# Patient Record
Sex: FEMALE | Race: WHITE | HISPANIC OR LATINO | URBAN - METROPOLITAN AREA
[De-identification: names, ages, dates, MRNs, and addresses within clinical notes are randomized per-mention and may not be internally consistent; named-entity substitution may affect disease eponyms.]

---

## 2020-05-26 ENCOUNTER — APPOINTMENT (RX ONLY)
Dept: URBAN - METROPOLITAN AREA CLINIC 45 | Facility: CLINIC | Age: 45
Setting detail: DERMATOLOGY
End: 2020-05-26

## 2020-05-26 DIAGNOSIS — D18.0 HEMANGIOMA: ICD-10-CM

## 2020-05-26 DIAGNOSIS — D22 MELANOCYTIC NEVI: ICD-10-CM

## 2020-05-26 DIAGNOSIS — L81.4 OTHER MELANIN HYPERPIGMENTATION: ICD-10-CM

## 2020-05-26 DIAGNOSIS — L68.0 HIRSUTISM: ICD-10-CM

## 2020-05-26 DIAGNOSIS — L82.1 OTHER SEBORRHEIC KERATOSIS: ICD-10-CM

## 2020-05-26 DIAGNOSIS — L98.8 OTHER SPECIFIED DISORDERS OF THE SKIN AND SUBCUTANEOUS TISSUE: ICD-10-CM

## 2020-05-26 PROBLEM — D48.5 NEOPLASM OF UNCERTAIN BEHAVIOR OF SKIN: Status: ACTIVE | Noted: 2020-05-26

## 2020-05-26 PROBLEM — D18.01 HEMANGIOMA OF SKIN AND SUBCUTANEOUS TISSUE: Status: ACTIVE | Noted: 2020-05-26

## 2020-05-26 PROBLEM — D22.5 MELANOCYTIC NEVI OF TRUNK: Status: ACTIVE | Noted: 2020-05-26

## 2020-05-26 PROCEDURE — ? COUNSELING

## 2020-05-26 PROCEDURE — ? SUNSCREEN RECOMMENDATIONS

## 2020-05-26 PROCEDURE — ? RECOMMENDATIONS

## 2020-05-26 PROCEDURE — ? OBSERVATION AND MEASURE

## 2020-05-26 PROCEDURE — ? PRESCRIPTION

## 2020-05-26 PROCEDURE — 99214 OFFICE O/P EST MOD 30 MIN: CPT | Mod: 25

## 2020-05-26 PROCEDURE — ? BIOPSY BY SHAVE METHOD

## 2020-05-26 PROCEDURE — ? TREATMENT REGIMEN

## 2020-05-26 PROCEDURE — 11102 TANGNTL BX SKIN SINGLE LES: CPT

## 2020-05-26 PROCEDURE — 11103 TANGNTL BX SKIN EA SEP/ADDL: CPT

## 2020-05-26 ASSESSMENT — LOCATION ZONE DERM
LOCATION ZONE: TRUNK
LOCATION ZONE: FACE

## 2020-05-26 ASSESSMENT — LOCATION SIMPLE DESCRIPTION DERM
LOCATION SIMPLE: ABDOMEN
LOCATION SIMPLE: RIGHT UPPER BACK
LOCATION SIMPLE: CHEST
LOCATION SIMPLE: RIGHT LOWER BACK
LOCATION SIMPLE: LEFT UPPER BACK
LOCATION SIMPLE: INFERIOR FOREHEAD
LOCATION SIMPLE: LEFT CHEEK
LOCATION SIMPLE: RIGHT CHEEK
LOCATION SIMPLE: UPPER BACK

## 2020-05-26 ASSESSMENT — LOCATION DETAILED DESCRIPTION DERM
LOCATION DETAILED: INFERIOR THORACIC SPINE
LOCATION DETAILED: RIGHT INFERIOR LATERAL LOWER BACK
LOCATION DETAILED: RIGHT RIB CAGE
LOCATION DETAILED: LEFT LATERAL MANDIBULAR CHEEK
LOCATION DETAILED: RIGHT LATERAL MANDIBULAR CHEEK
LOCATION DETAILED: LEFT MID-UPPER BACK
LOCATION DETAILED: RIGHT MEDIAL SUPERIOR CHEST
LOCATION DETAILED: RIGHT SUPERIOR UPPER BACK
LOCATION DETAILED: LEFT INFERIOR PREAURICULAR CHEEK
LOCATION DETAILED: RIGHT SUPERIOR CENTRAL MALAR CHEEK
LOCATION DETAILED: INFERIOR MID FOREHEAD
LOCATION DETAILED: RIGHT INFERIOR LATERAL MIDBACK

## 2020-05-26 NOTE — PROCEDURE: BIOPSY BY SHAVE METHOD
Detail Level: Detailed
Depth Of Biopsy: dermis
Was A Bandage Applied: Yes
Size Of Lesion In Cm: 0
Biopsy Type: H and E
Biopsy Method: double edge Personna blade
Anesthesia Type: 1% lidocaine with epinephrine and a 1:10 solution of 8.4% sodium bicarbonate
Anesthesia Volume In Cc (Will Not Render If 0): 1
Hemostasis: Aluminum Chloride
Wound Care: Aquaphor
Dressing: bandage
Destruction After The Procedure: No
Type Of Destruction Used: Curettage
Curettage Text: The wound bed was treated with curettage after the biopsy was performed.
Cryotherapy Text: The wound bed was treated with cryotherapy after the biopsy was performed.
Electrodesiccation Text: The wound bed was treated with electrodesiccation after the biopsy was performed.
Electrodesiccation And Curettage Text: The wound bed was treated with electrodesiccation and curettage after the biopsy was performed.
Silver Nitrate Text: The wound bed was treated with silver nitrate after the biopsy was performed.
Lab: 6
Lab Facility: [id number]
Path Notes (To The Dermatopathologist): Please filter
Consent: Written consent was obtained and risks were reviewed including but not limited to scarring, infection, bleeding, scabbing, incomplete removal, nerve damage and allergy to anesthesia.
Post-Care Instructions: I reviewed with the patient in detail post-care instructions. Patient is to keep the site covered for 24 hours. After this time period they may gently cleanse the site during baths/showers,  they may then  apply  petrolatum or Aquaphor and a bandage daily until healed.
Notification Instructions: Patient will be notified of biopsy results. However, patient instructed to call the office if not contacted within 2 weeks.
Billing Type: Third-Party Bill
Information: Selecting Yes will display possible errors in your note based on the variables you have selected. This validation is only offered as a suggestion for you. PLEASE NOTE THAT THE VALIDATION TEXT WILL BE REMOVED WHEN YOU FINALIZE YOUR NOTE. IF YOU WANT TO FAX A PRELIMINARY NOTE YOU WILL NEED TO TOGGLE THIS TO 'NO' IF YOU DO NOT WANT IT IN YOUR FAXED NOTE.
Size Of Lesion In Cm: 0.9
X Size Of Lesion In Cm: 0.5

## 2020-05-26 NOTE — PROCEDURE: TREATMENT REGIMEN
Detail Level: Zone
Initiate Treatment: Continue Tretinoin to increase to Tretinoin 0.1% sold in office

## 2020-05-26 NOTE — PROCEDURE: OBSERVATION
Body Location Override (Optional - Billing Will Still Be Based On Selected Body Map Location If Applicable): right mid lateral back
Detail Level: Detailed
Size Of Lesion: 0.5 x 0.2 mm
Body Location Override (Optional - Billing Will Still Be Based On Selected Body Map Location If Applicable): right lower lateral back
Size Of Lesion: 0.4 x 0.2mm

## 2020-07-30 ENCOUNTER — OFFICE VISIT (OUTPATIENT)
Dept: URBAN - METROPOLITAN AREA CLINIC 78 | Facility: CLINIC | Age: 45
End: 2020-07-30
Payer: OTHER GOVERNMENT

## 2020-07-30 DIAGNOSIS — K82.4 GALLBLADDER POLYP: ICD-10-CM

## 2020-07-30 DIAGNOSIS — F40.240 CLAUSTROPHOBIA: ICD-10-CM

## 2020-07-30 DIAGNOSIS — R10.11 RUQ PAIN: ICD-10-CM

## 2020-07-30 PROCEDURE — G9903 PT SCRN TBCO ID AS NON USER: HCPCS | Performed by: INTERNAL MEDICINE

## 2020-07-30 PROCEDURE — 99215 OFFICE O/P EST HI 40 MIN: CPT | Performed by: INTERNAL MEDICINE

## 2020-07-30 PROCEDURE — G8420 CALC BMI NORM PARAMETERS: HCPCS | Performed by: INTERNAL MEDICINE

## 2020-07-30 PROCEDURE — 1036F TOBACCO NON-USER: CPT | Performed by: INTERNAL MEDICINE

## 2020-07-30 PROCEDURE — G8427 DOCREV CUR MEDS BY ELIG CLIN: HCPCS | Performed by: INTERNAL MEDICINE

## 2020-07-30 RX ORDER — HYOSCYAMINE SULFATE 0.12 MG/1
1 TABLET UNDER THE TONGUE AND ALLOW TO DISSOLVE  AS NEEDED TABLET, ORALLY DISINTEGRATING ORAL
Qty: 40 | Refills: 1 | OUTPATIENT
Start: 2020-07-30

## 2020-07-30 NOTE — HPI-OTHER HISTORIES
The patient has been seen by Dr. Corbett in the past.  She feels a burning on the RUQ, right below, her breast, It is intermittent. She  states that when she gets the pain she is doubled over. She feels that she cannot breath or walk. More concerningly she developed a nodule in that area just a few days ago followed by a localized bruise "which developed out of the blue" once that nodule disappeared. She had never developed a bruise in this area previously. She specifically denies any trauma to the site. She has her GB in place nad has undergone extensive prior evaluation as detailed below. She has a known small GB polyp. She has suffered from constipation in the past. She used to be on Linzess. She is now taking prunes which she feels help her tremendously with having a soft, formed BM daily.  She has had E/C.  Her aunt had bile duct cancer.   Summary of prior workup: - HIDA scan in 2019 was normal. EF was 70%.  - E/C March 2019 by Dr. Corbett: small HH, antral edema (no H pylori), normal duodenum (no celiac sprue). Normal colon to the cecum.  - RUQ US: 2mm GB polyp, o/w unremarkable.  - Labs 6/16: Normal CBC, CMP, TSH and HbA1c.

## 2020-08-20 ENCOUNTER — OFFICE VISIT (OUTPATIENT)
Dept: URBAN - METROPOLITAN AREA CLINIC 98 | Facility: CLINIC | Age: 45
End: 2020-08-20

## 2020-08-31 ENCOUNTER — OFFICE VISIT (OUTPATIENT)
Dept: URBAN - METROPOLITAN AREA CLINIC 78 | Facility: CLINIC | Age: 45
End: 2020-08-31

## 2020-10-05 ENCOUNTER — OFFICE VISIT (OUTPATIENT)
Dept: URBAN - METROPOLITAN AREA CLINIC 78 | Facility: CLINIC | Age: 45
End: 2020-10-05

## 2020-10-05 RX ORDER — HYOSCYAMINE SULFATE 0.12 MG/1
1 TABLET UNDER THE TONGUE AND ALLOW TO DISSOLVE  AS NEEDED TABLET, ORALLY DISINTEGRATING ORAL
Qty: 40 | Refills: 1 | Status: ACTIVE | COMMUNITY
Start: 2020-07-30

## 2020-10-05 RX ORDER — HYOSCYAMINE SULFATE 0.12 MG/1
1 TABLET UNDER THE TONGUE AND ALLOW TO DISSOLVE  AS NEEDED TABLET, ORALLY DISINTEGRATING ORAL
Qty: 40 | Refills: 1 | OUTPATIENT

## 2020-10-05 NOTE — HPI-OTHER HISTORIES
The patient has been seen by Dr. oCrbett in the past.  She feels a burning on the RUQ, right below, her breast, It is intermittent. She  states that when she gets the pain she is doubled over. She feels that she cannot breath or walk. More concerningly she developed a nodule in that area just a few days ago followed by a localized bruise "which developed out of the blue" once that nodule disappeared. She had never developed a bruise in this area previously. She specifically denies any trauma to the site. She has her GB in place nad has undergone extensive prior evaluation as detailed below. She has a known small GB polyp. She has suffered from constipation in the past. She used to be on Linzess. She is now taking prunes which she feels help her tremendously with having a soft, formed BM daily.  She has had E/C.  Her aunt had bile duct cancer.   Summary of prior workup: - MRI on 8/13/20: Visualized portions of the lower chest normal. Unremarkable liver, GB, biliary tree, pancreas, spleen, adrenal glands, kidneys and ureters. Retroverted uterus, no myometrial mass. Several Nabothian cysts in the cervix.  - HIDA scan in 2019 was normal. EF was 70%.  - E/C March 2019 by Dr. Corbett: small HH, antral edema (no H pylori), normal duodenum (no celiac sprue). Normal colon to the cecum.  - RUQ US: 2mm GB polyp, o/w unremarkable.  - Labs 6/16: Normal CBC, CMP, TSH and HbA1c.

## 2020-12-22 ENCOUNTER — OFFICE VISIT (OUTPATIENT)
Dept: URBAN - METROPOLITAN AREA CLINIC 78 | Facility: CLINIC | Age: 45
End: 2020-12-22

## 2020-12-22 RX ORDER — HYOSCYAMINE SULFATE 0.12 MG/1
1 TABLET UNDER THE TONGUE AND ALLOW TO DISSOLVE  AS NEEDED TABLET, ORALLY DISINTEGRATING ORAL
Qty: 40 | Refills: 1 | OUTPATIENT

## 2020-12-22 NOTE — HPI-OTHER HISTORIES
The patient has been seen by Dr. Corbett in the past.  She feels a burning on the RUQ, right below, her breast, It is intermittent. She  states that when she gets the pain she is doubled over. She feels that she cannot breath or walk. More concerningly she developed a nodule in that area just a few days ago followed by a localized bruise "which developed out of the blue" once that nodule disappeared. She had never developed a bruise in this area previously. She specifically denies any trauma to the site. She has her GB in place nad has undergone extensive prior evaluation as detailed below. She has a known small GB polyp. She has suffered from constipation in the past. She used to be on Linzess. She is now taking prunes which she feels help her tremendously with having a soft, formed BM daily.  She has had E/C.  Her aunt had bile duct cancer.   Summary of prior workup: - MRI on 8/13/20: Visualized portions of the lower chest normal. Unremarkable liver, GB, biliary tree, pancreas, spleen, adrenal glands, kidneys and ureters. Retroverted uterus, no myometrial mass. Several Nabothian cysts in the cervix.  - HIDA scan in 2019 was normal. EF was 70%.  - E/C March 2019 by Dr. Corbett: small HH, antral edema (no H pylori), normal duodenum (no celiac sprue). Normal colon to the cecum.  - RUQ US: 2mm GB polyp, o/w unremarkable.  - Labs 6/16: Normal CBC, CMP, TSH and HbA1c.

## 2021-01-04 ENCOUNTER — LAB OUTSIDE AN ENCOUNTER (OUTPATIENT)
Dept: URBAN - METROPOLITAN AREA CLINIC 78 | Facility: CLINIC | Age: 46
End: 2021-01-04

## 2021-01-25 ENCOUNTER — OFFICE VISIT (OUTPATIENT)
Dept: URBAN - METROPOLITAN AREA CLINIC 78 | Facility: CLINIC | Age: 46
End: 2021-01-25
Payer: OTHER GOVERNMENT

## 2021-01-25 DIAGNOSIS — K58.9 IBS (IRRITABLE BOWEL SYNDROME): ICD-10-CM

## 2021-01-25 DIAGNOSIS — F43.9 STRESS: ICD-10-CM

## 2021-01-25 DIAGNOSIS — R10.11 RUQ PAIN: ICD-10-CM

## 2021-01-25 DIAGNOSIS — K82.4 GALLBLADDER POLYP: ICD-10-CM

## 2021-01-25 DIAGNOSIS — R14.0 BLOATING: ICD-10-CM

## 2021-01-25 DIAGNOSIS — F40.240 CLAUSTROPHOBIA: ICD-10-CM

## 2021-01-25 PROCEDURE — G8482 FLU IMMUNIZE ORDER/ADMIN: HCPCS | Performed by: INTERNAL MEDICINE

## 2021-01-25 PROCEDURE — 99214 OFFICE O/P EST MOD 30 MIN: CPT | Performed by: INTERNAL MEDICINE

## 2021-01-25 PROCEDURE — G9903 PT SCRN TBCO ID AS NON USER: HCPCS | Performed by: INTERNAL MEDICINE

## 2021-01-25 PROCEDURE — G8420 CALC BMI NORM PARAMETERS: HCPCS | Performed by: INTERNAL MEDICINE

## 2021-01-25 PROCEDURE — G8427 DOCREV CUR MEDS BY ELIG CLIN: HCPCS | Performed by: INTERNAL MEDICINE

## 2021-01-25 RX ORDER — OMEPRAZOLE 40 MG/1
1 CAPSULE 30 MINUTES BEFORE MORNING MEAL AND 30 MINUTES BEFORE DINNER CAPSULE, DELAYED RELEASE ORAL BID
Qty: 60 | Refills: 2 | OUTPATIENT

## 2021-01-25 RX ORDER — HYOSCYAMINE SULFATE 0.12 MG/1
1 TABLET UNDER THE TONGUE AND ALLOW TO DISSOLVE  AS NEEDED TABLET, ORALLY DISINTEGRATING ORAL
Qty: 40 | Refills: 1 | OUTPATIENT

## 2021-01-25 RX ORDER — HYOSCYAMINE SULFATE 0.12 MG/1
1 TABLET UNDER THE TONGUE AND ALLOW TO DISSOLVE  AS NEEDED TABLET, ORALLY DISINTEGRATING ORAL
Qty: 40 | Refills: 1 | Status: ACTIVE | COMMUNITY

## 2021-01-25 NOTE — HPI-OTHER HISTORIES
She feels a burning on the RUQ, right below, her breast. It is intermittent. She  states that when she gets the pain she is doubled over. She feels that she cannot breath or walk. More concerningly she developed a nodule in that area just a few days ago followed by a localized bruise "which developed out of the blue" once that nodule disappeared. She had never developed a bruise in this area previously. She specifically denies any trauma to the site.  of note, she had another episode of acute pain in early January and had the labs I had requested done at that time. These were unremarkable as detailed below.  She has the pain currently, but it is different from her baseline. She currently descibes it as a dull/burning sensation, constant, 7-8/10 in intensity.  She has her GB in place nad has undergone extensive prior evaluation as detailed below. She has a known small GB polyp. Today we reviewed the results of her MRI.  She has suffered from constipation in the past. She used to be on Linzess. She is now taking prunes which she feels help her tremendously with having a soft, formed BM daily.   Her aunt had bile duct cancer.   She got COVID in December.   Summary of prior workup: - Labs 1/4/20: TP 7.9, Alb 4.8, TB 0.4, AP 95, AST 17, ALT 19, Amylase 52, Lipase 22.  - MRI on 8/13/20: Visualized portions of the lower chest normal. Unremarkable liver, GB, biliary tree, pancreas, spleen, adrenal glands, kidneys and ureters. Retroverted uterus, no myometrial mass. Several Nabothian cysts in the cervix.  - HIDA scan in 2019 was normal. EF was 70%.  - E/C March 2019 by Dr. Corbett: small HH, antral edema (no H pylori), normal duodenum (no celiac sprue). Normal colon to the cecum.  - RUQ US: 2mm GB polyp, o/w unremarkable.  - Labs 6/16: Normal CBC, CMP, TSH and HbA1c.

## 2021-02-21 LAB
ALBUMIN/GLOBULIN RATIO: 1.5
ALBUMIN: 4.8
ALKALINE PHOSPHATASE: 95
ALT (SGPT): 19
AMYLASE: 52
AST (SGOT): 17
BILIRUBIN, DIRECT: 0.1
BILIRUBIN, INDIRECT: 0.3
BILIRUBIN, TOTAL: 0.4
GLOBULIN: 3.1
LIPASE: 22
PROTEIN, TOTAL: 7.9

## 2021-04-01 ENCOUNTER — OFFICE VISIT (OUTPATIENT)
Dept: URBAN - METROPOLITAN AREA CLINIC 78 | Facility: CLINIC | Age: 46
End: 2021-04-01

## 2021-04-01 ENCOUNTER — OFFICE VISIT (OUTPATIENT)
Dept: URBAN - METROPOLITAN AREA CLINIC 78 | Facility: CLINIC | Age: 46
End: 2021-04-01
Payer: OTHER GOVERNMENT

## 2021-04-01 DIAGNOSIS — F43.9 STRESS: ICD-10-CM

## 2021-04-01 DIAGNOSIS — R14.0 BLOATING: ICD-10-CM

## 2021-04-01 DIAGNOSIS — R10.11 RUQ PAIN: ICD-10-CM

## 2021-04-01 DIAGNOSIS — K82.4 GALLBLADDER POLYP: ICD-10-CM

## 2021-04-01 DIAGNOSIS — K58.9 IBS (IRRITABLE BOWEL SYNDROME): ICD-10-CM

## 2021-04-01 DIAGNOSIS — F40.240 CLAUSTROPHOBIA: ICD-10-CM

## 2021-04-01 PROCEDURE — 99213 OFFICE O/P EST LOW 20 MIN: CPT | Performed by: INTERNAL MEDICINE

## 2021-04-01 RX ORDER — HYOSCYAMINE SULFATE 0.12 MG/1
1 TABLET UNDER THE TONGUE AND ALLOW TO DISSOLVE  AS NEEDED TABLET, ORALLY DISINTEGRATING ORAL
Qty: 40 | Refills: 1 | OUTPATIENT

## 2021-04-01 RX ORDER — OMEPRAZOLE 40 MG/1
1 CAPSULE 30 MINUTES BEFORE MORNING MEAL AND 30 MINUTES BEFORE DINNER CAPSULE, DELAYED RELEASE ORAL BID
Qty: 60 | Refills: 2 | Status: ACTIVE | COMMUNITY

## 2021-04-01 RX ORDER — HYOSCYAMINE SULFATE 0.12 MG/1
1 TABLET UNDER THE TONGUE AND ALLOW TO DISSOLVE  AS NEEDED TABLET, ORALLY DISINTEGRATING ORAL
Qty: 40 | Refills: 1 | Status: ACTIVE | COMMUNITY

## 2021-04-01 NOTE — HPI-OTHER HISTORIES
She feels a burning on the RUQ, right below, her breast. It is intermittent. She  states that when she gets the pain she is doubled over. She feels that she cannot breath or walk. More concerningly she developed a nodule in that area just a few days ago followed by a localized bruise "which developed out of the blue" once that nodule disappeared. She had never developed a bruise in this area previously. She specifically denies any trauma to the site.  Of note, she had another episode of acute pain in early January and had the labs I had requested done at that time. These were unremarkable as detailed below.  She has been compliant with both the Omeprazole and IB Antonina. She feels that the bloating has improved but not the pain.  She has the pain currently, but it is different from her baseline. She currently descibes it as a dull/burning sensation, constant, 7-8/10 in intensity.  She has her GB in place nad has undergone extensive prior evaluation as detailed below. She has a known small GB polyp.  She has suffered from constipation in the past. She used to be on Linzess. She is now taking prunes which she feels help her tremendously with having a soft, formed BM daily.  She has beenusing Colace. Her stools have been  She has not yet tried using the Levsin.   Her aunt had bile duct cancer.   She had a hysterectomy 4 weeks ago. She took pain meds for about 3-4 days (Oxycodone) ad since has been taking Ibuprofen prn. She was told she may have had some endometriosis.   She got COVID in December.   Summary of prior workup: - CT A/P on 3/23/21: S/P hysterectomy without apparent complication. Small fat-containing umbilical hernia. No pelvic or inguinal hernia. No free fluid seen within the pelvic cavity.  - Labs 1/4/20: TP 7.9, Alb 4.8, TB 0.4, AP 95, AST 17, ALT 19, Amylase 52, Lipase 22.  - MRI on 8/13/20: Visualized portions of the lower chest normal. Unremarkable liver, GB, biliary tree, pancreas, spleen, adrenal glands, kidneys and ureters. Retroverted uterus, no myometrial mass. Several Nabothian cysts in the cervix.  - HIDA scan in 2019 was normal. EF was 70%.  - E/C March 2019 by Dr. Corbett: small HH, antral edema (no H pylori), normal duodenum (no celiac sprue). Normal colon to the cecum.  - RUQ US: 2mm GB polyp, o/w unremarkable.  - Labs 6/16: Normal CBC, CMP, TSH and HbA1c.

## 2021-05-10 ENCOUNTER — OFFICE VISIT (OUTPATIENT)
Dept: URBAN - METROPOLITAN AREA CLINIC 78 | Facility: CLINIC | Age: 46
End: 2021-05-10

## 2021-07-06 ENCOUNTER — OFFICE VISIT (OUTPATIENT)
Dept: URBAN - METROPOLITAN AREA CLINIC 78 | Facility: CLINIC | Age: 46
End: 2021-07-06

## 2021-07-06 RX ORDER — HYOSCYAMINE SULFATE 0.12 MG/1
1 TABLET UNDER THE TONGUE AND ALLOW TO DISSOLVE  AS NEEDED TABLET, ORALLY DISINTEGRATING ORAL
Qty: 40 | Refills: 1 | OUTPATIENT

## 2021-12-15 ENCOUNTER — OFFICE VISIT (OUTPATIENT)
Dept: URBAN - METROPOLITAN AREA CLINIC 78 | Facility: CLINIC | Age: 46
End: 2021-12-15
Payer: OTHER GOVERNMENT

## 2021-12-15 VITALS
HEART RATE: 65 BPM | TEMPERATURE: 98 F | DIASTOLIC BLOOD PRESSURE: 78 MMHG | SYSTOLIC BLOOD PRESSURE: 118 MMHG | HEIGHT: 64 IN | BODY MASS INDEX: 25.88 KG/M2 | WEIGHT: 151.6 LBS

## 2021-12-15 DIAGNOSIS — R76.8 POSITIVE HEPATITIS C ANTIBODY TEST: ICD-10-CM

## 2021-12-15 PROCEDURE — G8420 CALC BMI NORM PARAMETERS: HCPCS | Performed by: INTERNAL MEDICINE

## 2021-12-15 PROCEDURE — 99214 OFFICE O/P EST MOD 30 MIN: CPT | Performed by: INTERNAL MEDICINE

## 2021-12-15 PROCEDURE — 3017F COLORECTAL CA SCREEN DOC REV: CPT | Performed by: INTERNAL MEDICINE

## 2021-12-15 PROCEDURE — G9622 NO UNHEAL ETOH USER: HCPCS | Performed by: INTERNAL MEDICINE

## 2021-12-15 PROCEDURE — 1036F TOBACCO NON-USER: CPT | Performed by: INTERNAL MEDICINE

## 2021-12-15 PROCEDURE — G8427 DOCREV CUR MEDS BY ELIG CLIN: HCPCS | Performed by: INTERNAL MEDICINE

## 2021-12-15 RX ORDER — HYOSCYAMINE SULFATE 0.12 MG/1
1 TABLET UNDER THE TONGUE AND ALLOW TO DISSOLVE  AS NEEDED TABLET, ORALLY DISINTEGRATING ORAL
Qty: 40 | Refills: 1 | Status: ACTIVE | COMMUNITY

## 2021-12-15 RX ORDER — OMEPRAZOLE 40 MG/1
1 CAPSULE 30 MINUTES BEFORE MORNING MEAL AND 30 MINUTES BEFORE DINNER CAPSULE, DELAYED RELEASE ORAL BID
Qty: 60 | Refills: 2 | Status: ACTIVE | COMMUNITY

## 2021-12-15 NOTE — HPI-TODAY'S VISIT:
46-year-old female referred by Dr. Ruddy Schwartz rheumatologist for positive hep C serology. Serology positive, PCR test negative.  Denies risky behavior.

## 2021-12-15 NOTE — PREVIOUS WORKUP REVIEWED
.LABS -Labs 11/16/2021:Hep C RNA PCR negative, hep C antibody positive,. Protein electrophoresis normal, IgG 1357, hep A IgM negative, hep B surface antigen negative, hep B core antibody IgM negative.-Labs 9/29/2021:Glucose 95, BUN 12, creatinine 0.89, total protein 7.0, albumin 4.3, total bilirubin 0.6, alkaline phosphatase 75, AST 16, ALT 15. Iron saturation 30%, ferritin 35, ESR 6, WBC 6.9, hemoglobin 12.8, platelet 259, CRP 1.9.

## 2022-05-18 ENCOUNTER — OFFICE VISIT (OUTPATIENT)
Dept: URBAN - METROPOLITAN AREA CLINIC 78 | Facility: CLINIC | Age: 47
End: 2022-05-18

## 2022-07-28 ENCOUNTER — OFFICE VISIT (OUTPATIENT)
Dept: URBAN - METROPOLITAN AREA CLINIC 78 | Facility: CLINIC | Age: 47
End: 2022-07-28
Payer: OTHER GOVERNMENT

## 2022-07-28 VITALS
WEIGHT: 138.2 LBS | BODY MASS INDEX: 23.6 KG/M2 | SYSTOLIC BLOOD PRESSURE: 97 MMHG | HEIGHT: 64 IN | TEMPERATURE: 97.3 F | HEART RATE: 62 BPM | DIASTOLIC BLOOD PRESSURE: 63 MMHG

## 2022-07-28 DIAGNOSIS — F43.9 STRESS: ICD-10-CM

## 2022-07-28 DIAGNOSIS — R19.8 ALTERNATING CONSTIPATION AND DIARRHEA: ICD-10-CM

## 2022-07-28 DIAGNOSIS — K58.9 IBS (IRRITABLE BOWEL SYNDROME): ICD-10-CM

## 2022-07-28 DIAGNOSIS — K82.4 GALLBLADDER POLYP: ICD-10-CM

## 2022-07-28 DIAGNOSIS — R14.0 BLOATING: ICD-10-CM

## 2022-07-28 DIAGNOSIS — R49.0 HOARSENESS: ICD-10-CM

## 2022-07-28 DIAGNOSIS — R10.11 RUQ PAIN: ICD-10-CM

## 2022-07-28 DIAGNOSIS — R93.5 ABNORMAL CT OF THE ABDOMEN: ICD-10-CM

## 2022-07-28 DIAGNOSIS — F40.240 CLAUSTROPHOBIA: ICD-10-CM

## 2022-07-28 PROBLEM — 262188008 STRESS: Status: ACTIVE | Noted: 2021-01-25

## 2022-07-28 PROBLEM — 19887002 CLAUSTROPHOBIA: Status: ACTIVE | Noted: 2020-07-30

## 2022-07-28 PROBLEM — 10743008 IRRITABLE BOWEL SYNDROME: Status: ACTIVE | Noted: 2021-01-25

## 2022-07-28 PROCEDURE — 99214 OFFICE O/P EST MOD 30 MIN: CPT | Performed by: INTERNAL MEDICINE

## 2022-07-28 RX ORDER — NAPROXEN 500 MG/1
TABLET ORAL
Qty: 14 TABLET | Status: ACTIVE | COMMUNITY

## 2022-07-28 RX ORDER — CELECOXIB 200 MG/1
TAKE ONE CAPSULE BY MOUTH ONE TIME DAILY CAPSULE ORAL
Qty: 30 UNSPECIFIED | Refills: 0 | Status: ACTIVE | COMMUNITY

## 2022-07-28 RX ORDER — PANTOPRAZOLE SODIUM 40 MG/1
TAKE ONE TABLET BY MOUTH EVERY MORNING TABLET, DELAYED RELEASE ORAL
Qty: 30 UNSPECIFIED | Refills: 1 | Status: ACTIVE | COMMUNITY

## 2022-07-28 RX ORDER — HYOSCYAMINE SULFATE 0.12 MG/1
1 TABLET UNDER THE TONGUE AND ALLOW TO DISSOLVE  AS NEEDED TABLET, ORALLY DISINTEGRATING ORAL
Qty: 40 | Refills: 1 | Status: ACTIVE | COMMUNITY

## 2022-07-28 RX ORDER — CIPROFLOXACIN HYDROCHLORIDE 500 MG/1
TABLET, FILM COATED ORAL
Qty: 14 TABLET | Status: ACTIVE | COMMUNITY

## 2022-07-28 RX ORDER — FLUTICASONE PROPIONATE 50 UG/1
USE ONE SPRAY IN EACH NOSTRIL ONE TIME DAILY SPRAY, METERED NASAL
Qty: 16 UNSPECIFIED | Refills: 1 | Status: ACTIVE | COMMUNITY

## 2022-07-28 RX ORDER — OMEPRAZOLE 40 MG/1
1 CAPSULE 30 MINUTES BEFORE MORNING MEAL AND 30 MINUTES BEFORE DINNER CAPSULE, DELAYED RELEASE ORAL BID
Qty: 60 | Refills: 2 | Status: ACTIVE | COMMUNITY

## 2022-07-28 RX ORDER — ONDANSETRON HYDROCHLORIDE 4 MG/1
1 TABLET TABLET, FILM COATED ORAL
Qty: 7 | Refills: 0 | OUTPATIENT
Start: 2022-07-28

## 2022-07-28 NOTE — HPI-OTHER HISTORIES
She had intially seen me for a burning in the RUQ, right below her breast. It is intermittent. She  states that when she gets the pain she is doubled over. She feels that she cannot breath or walk. More concerningly she developed a nodule in that area just a few days ago followed by a localized bruise "which developed out of the blue" once that nodule disappeared. She had never developed a bruise in this area previously. She specifically denies any trauma to the site.  She has been compliant with both the Omeprazole and IB Antonina. She feels that the bloating has improved but not the pain.  She has the pain currently, but it is different from her baseline. She currently descibes it as a dull/burning sensation, constant, 7-8/10 in intensity.  She has her GB in place nad has undergone extensive prior evaluation as detailed below. She has a known small GB polyp.  She has suffered from constipation in the past. She used to be on Linzess. She is now taking prunes which she feels help her tremendously with having a soft, formed BM daily.  She has recently also been using Colace. She has not yet tried using the Levsin.   Patient was seen on 5/26/2022 by Dr. Rowland's group for constipation with occasional diarrhea and abnormal CT suggestive of inflammation of the sigmoid colon. She has not had dysuria per se, but she admits to an itching when she urinates. She has noted a tenderness in the LLQ and pain radiating to her back. She has noted rare instances of mild amount of blood both in the TP and mixed in the stools. She believes it may be due to hemorrhoids.   Her aunt had bile duct cancer.   She had a hysterectomy 4 weeks ago. She took pain meds for about 3-4 days (Oxycodone) ad since has been taking Ibuprofen prn. She was told she may have had some endometriosis.   She got COVID in December.   She was seen previously (Dec 2021) by Dr. Vitale for positive hep C antibody but negative PCR suggestive of either previous infection with clearance or false positive serology.  Summary of prior workup: - CTE abdomen and pelvis showed mild inflammatory changes and bowel wall thickening involving the rectosigmoid.  Mild diffuse urinary bladder wall thickening with mild surrounding inflammatory changes suggestive of cystitis. - Labs on 5/4/2022: Celiac panel negative.  WBC 6.1, hemoglobin 12.9, MCV 90, platelets 254.  Glucose 83, BUN 15, creatinine 0.7, sodium 142, potassium 3.9, calcium 9.2, total protein 7.1, albumin 4.6, total bilirubin 0.7, alkaline phosphatase 72, AST 15, ALT 12.  TSH 1.86, free T4 6.4, CRP 1 - CT A/P on 3/23/21: S/P hysterectomy without apparent complication. Small fat-containing umbilical hernia. No pelvic or inguinal hernia. No free fluid seen within the pelvic cavity.  - Labs 1/4/20: TP 7.9, Alb 4.8, TB 0.4, AP 95, AST 17, ALT 19, Amylase 52, Lipase 22.  - MRI on 8/13/20: Visualized portions of the lower chest normal. Unremarkable liver, GB, biliary tree, pancreas, spleen, adrenal glands, kidneys and ureters. Retroverted uterus, no myometrial mass. Several Nabothian cysts in the cervix.  - HIDA scan in 2019 was normal. EF was 70%.  - E/C March 2019 by Dr. Corbett: small HH, antral edema (no H pylori), normal duodenum (no celiac sprue). Normal colon to the cecum.  - RUQ US: 2mm GB polyp, o/w unremarkable.  - Labs 6/16: Normal CBC, CMP, TSH and HbA1c.

## 2022-09-07 ENCOUNTER — OFFICE VISIT (OUTPATIENT)
Dept: URBAN - METROPOLITAN AREA SURGERY CENTER 15 | Facility: SURGERY CENTER | Age: 47
End: 2022-09-07
Payer: OTHER GOVERNMENT

## 2022-09-07 DIAGNOSIS — R10.11 RUQ PAIN: ICD-10-CM

## 2022-09-07 DIAGNOSIS — R93.5 ABNORMAL CT OF THE ABDOMEN: ICD-10-CM

## 2022-09-07 PROCEDURE — G8907 PT DOC NO EVENTS ON DISCHARG: HCPCS | Performed by: INTERNAL MEDICINE

## 2022-09-07 PROCEDURE — 45378 DIAGNOSTIC COLONOSCOPY: CPT | Performed by: INTERNAL MEDICINE

## 2022-09-07 RX ORDER — HYOSCYAMINE SULFATE 0.12 MG/1
1 TABLET UNDER THE TONGUE AND ALLOW TO DISSOLVE  AS NEEDED TABLET, ORALLY DISINTEGRATING ORAL
Qty: 40 | Refills: 1 | Status: ACTIVE | COMMUNITY

## 2022-09-07 RX ORDER — CELECOXIB 200 MG/1
TAKE ONE CAPSULE BY MOUTH ONE TIME DAILY CAPSULE ORAL
Qty: 30 UNSPECIFIED | Refills: 0 | Status: ACTIVE | COMMUNITY

## 2022-09-07 RX ORDER — FLUTICASONE PROPIONATE 50 UG/1
USE ONE SPRAY IN EACH NOSTRIL ONE TIME DAILY SPRAY, METERED NASAL
Qty: 16 UNSPECIFIED | Refills: 1 | Status: ACTIVE | COMMUNITY

## 2022-09-07 RX ORDER — ONDANSETRON HYDROCHLORIDE 4 MG/1
1 TABLET TABLET, FILM COATED ORAL
Qty: 7 | Refills: 0 | Status: ACTIVE | COMMUNITY
Start: 2022-07-28

## 2022-09-07 RX ORDER — NAPROXEN 500 MG/1
TABLET ORAL
Qty: 14 TABLET | Status: ACTIVE | COMMUNITY

## 2022-09-07 RX ORDER — CIPROFLOXACIN HYDROCHLORIDE 500 MG/1
TABLET, FILM COATED ORAL
Qty: 14 TABLET | Status: ACTIVE | COMMUNITY

## 2022-09-07 RX ORDER — OMEPRAZOLE 40 MG/1
1 CAPSULE 30 MINUTES BEFORE MORNING MEAL AND 30 MINUTES BEFORE DINNER CAPSULE, DELAYED RELEASE ORAL BID
Qty: 60 | Refills: 2 | Status: ACTIVE | COMMUNITY

## 2022-09-07 RX ORDER — PANTOPRAZOLE SODIUM 40 MG/1
TAKE ONE TABLET BY MOUTH EVERY MORNING TABLET, DELAYED RELEASE ORAL
Qty: 30 UNSPECIFIED | Refills: 1 | Status: ACTIVE | COMMUNITY

## 2023-12-06 ENCOUNTER — DASHBOARD ENCOUNTERS (OUTPATIENT)
Age: 48
End: 2023-12-06

## 2023-12-06 ENCOUNTER — OFFICE VISIT (OUTPATIENT)
Dept: URBAN - METROPOLITAN AREA CLINIC 78 | Facility: CLINIC | Age: 48
End: 2023-12-06
Payer: OTHER GOVERNMENT

## 2023-12-06 VITALS
TEMPERATURE: 98.1 F | BODY MASS INDEX: 24.21 KG/M2 | HEIGHT: 64 IN | RESPIRATION RATE: 16 BRPM | WEIGHT: 141.8 LBS | HEART RATE: 86 BPM | DIASTOLIC BLOOD PRESSURE: 72 MMHG | SYSTOLIC BLOOD PRESSURE: 104 MMHG

## 2023-12-06 DIAGNOSIS — F43.9 STRESS: ICD-10-CM

## 2023-12-06 DIAGNOSIS — K82.4 GALLBLADDER POLYP: ICD-10-CM

## 2023-12-06 DIAGNOSIS — R10.11 RUQ PAIN: ICD-10-CM

## 2023-12-06 DIAGNOSIS — R74.01 ELEVATED ALT MEASUREMENT: ICD-10-CM

## 2023-12-06 PROCEDURE — 99213 OFFICE O/P EST LOW 20 MIN: CPT | Performed by: INTERNAL MEDICINE

## 2023-12-06 RX ORDER — ONDANSETRON HYDROCHLORIDE 4 MG/1
1 TABLET TABLET, FILM COATED ORAL
Qty: 7 | Refills: 0 | Status: ACTIVE | COMMUNITY
Start: 2022-07-28

## 2023-12-06 RX ORDER — NAPROXEN 500 MG/1
TABLET ORAL
Qty: 14 TABLET | Status: ACTIVE | COMMUNITY

## 2023-12-06 RX ORDER — CIPROFLOXACIN HYDROCHLORIDE 500 MG/1
TABLET, FILM COATED ORAL
Qty: 14 TABLET | Status: ACTIVE | COMMUNITY

## 2023-12-06 RX ORDER — CELECOXIB 200 MG/1
TAKE ONE CAPSULE BY MOUTH ONE TIME DAILY CAPSULE ORAL
Qty: 30 UNSPECIFIED | Refills: 0 | Status: ACTIVE | COMMUNITY

## 2023-12-06 RX ORDER — OMEPRAZOLE 40 MG/1
1 CAPSULE 30 MINUTES BEFORE MORNING MEAL AND 30 MINUTES BEFORE DINNER CAPSULE, DELAYED RELEASE ORAL BID
Qty: 60 | Refills: 2 | Status: ACTIVE | COMMUNITY

## 2023-12-06 RX ORDER — PANTOPRAZOLE SODIUM 40 MG/1
TAKE ONE TABLET BY MOUTH EVERY MORNING TABLET, DELAYED RELEASE ORAL
Qty: 30 UNSPECIFIED | Refills: 1 | Status: ACTIVE | COMMUNITY

## 2023-12-06 RX ORDER — HYOSCYAMINE SULFATE 0.12 MG/1
1 TABLET UNDER THE TONGUE AND ALLOW TO DISSOLVE  AS NEEDED TABLET, ORALLY DISINTEGRATING ORAL
Qty: 40 | Refills: 1 | Status: ACTIVE | COMMUNITY

## 2023-12-06 RX ORDER — FLUTICASONE PROPIONATE 50 UG/1
USE ONE SPRAY IN EACH NOSTRIL ONE TIME DAILY SPRAY, METERED NASAL
Qty: 16 UNSPECIFIED | Refills: 1 | Status: ACTIVE | COMMUNITY

## 2023-12-06 NOTE — PHYSICAL EXAM GASTROINTESTINAL
Abdomen , soft, Carnetts positive , nondistended , no guarding or rigidity , no masses palpable , normal bowel sounds , Liver and Spleen , no hepatomegaly present , liver nontender , spleen not palpable

## 2023-12-06 NOTE — HPI-OTHER HISTORIES
Patient seen in my office.  Has had extensive workup done summarized below for right upper quadrant pain.  She has been seeing Dr. Ho and undergone extensive workup as enumerated below.  The reason she is here is because of her labs in October revealed an ALT elevation of 31 and repeat labs normalized.  She was alarmed because her MA told her that her liver was damaged overall she does not drink alcohol regularly but has cut back since her recent labs.   Labs 11/14/2023:BUN 12/0.79Sodium 141 with potassium 4.0 with chloride of 105 calcium 9.8 GGT 15Albumin 4.9 GGT 15 AST/ALT 16/13  CMP is normal  Amylas and Lipase is normal   10/24/23  ALT 31  WBC 11.6  CMP is normal otherwise   Summary of prior workup: - CTE abdomen and pelvis showed mild inflammatory changes and bowel wall thickening involving the rectosigmoid. Mild diffuse urinary bladder wall thickening with mild surrounding inflammatory changes suggestive of cystitis. - Labs on 5/4/2022: Celiac panel negative. WBC 6.1, hemoglobin 12.9, MCV 90, platelets 254. Glucose 83, BUN 15, creatinine 0.7, sodium 142, potassium 3.9, calcium 9.2, total protein 7.1, albumin 4.6, total bilirubin 0.7, alkaline phosphatase 72, AST 15, ALT 12. TSH 1.86, free T4 6.4, CRP 1 - CT A/P on 3/23/21: S/P hysterectomy without apparent complication. Small fat-containing umbilical hernia. No pelvic or inguinal hernia. No free fluid seen within the pelvic cavity. - Labs 1/4/20: TP 7.9, Alb 4.8, TB 0.4, AP 95, AST 17, ALT 19, Amylase 52, Lipase 22. - MRI on 8/13/20: Visualized portions of the lower chest normal. Unremarkable liver, GB, biliary tree, pancreas, spleen, adrenal glands, kidneys and ureters. Retroverted uterus, no myometrial mass. Several Nabothian cysts in the cervix. - HIDA scan in 2019 was normal. EF was 70%. - E/C March 2019 by Dr. Corbett: small HH, antral edema (no H pylori), normal duodenum (no celiac sprue). Normal colon to the cecum. - RUQ US: 2mm GB polyp, o/w unremarkable. - Labs 6/16: Normal CBC, CMP, TSH and HbA1c.      PREVIOUS ENCOUNTER:  She had intially seen me for a burning in the RUQ, right below her breast. It is intermittent. She  states that when she gets the pain she is doubled over. She feels that she cannot breath or walk. More concerningly she developed a nodule in that area just a few days ago followed by a localized bruise "which developed out of the blue" once that nodule disappeared. She had never developed a bruise in this area previously. She specifically denies any trauma to the site.  She has been compliant with both the Omeprazole and IB Antonina. She feels that the bloating has improved but not the pain.  She has the pain currently, but it is different from her baseline. She currently descibes it as a dull/burning sensation, constant, 7-8/10 in intensity.  She has her GB in place nad has undergone extensive prior evaluation as detailed below. She has a known small GB polyp.  She has suffered from constipation in the past. She used to be on Linzess. She is now taking prunes which she feels help her tremendously with having a soft, formed BM daily.  She has recently also been using Colace. She has not yet tried using the Levsin.   Patient was seen on 5/26/2022 by Dr. Rowland's group for constipation with occasional diarrhea and abnormal CT suggestive of inflammation of the sigmoid colon. She has not had dysuria per se, but she admits to an itching when she urinates. She has noted a tenderness in the LLQ and pain radiating to her back. She has noted rare instances of mild amount of blood both in the TP and mixed in the stools. She believes it may be due to hemorrhoids.   Her aunt had bile duct cancer.   She had a hysterectomy 4 weeks ago. She took pain meds for about 3-4 days (Oxycodone) ad since has been taking Ibuprofen prn. She was told she may have had some endometriosis.   She got COVID in December.   She was seen previously (Dec 2021) by Dr. Vitale for positive hep C antibody but negative PCR suggestive of either previous infection with clearance or false positive serology.  Summary of prior workup: - CTE abdomen and pelvis showed mild inflammatory changes and bowel wall thickening involving the rectosigmoid.  Mild diffuse urinary bladder wall thickening with mild surrounding inflammatory changes suggestive of cystitis. - Labs on 5/4/2022: Celiac panel negative.  WBC 6.1, hemoglobin 12.9, MCV 90, platelets 254.  Glucose 83, BUN 15, creatinine 0.7, sodium 142, potassium 3.9, calcium 9.2, total protein 7.1, albumin 4.6, total bilirubin 0.7, alkaline phosphatase 72, AST 15, ALT 12.  TSH 1.86, free T4 6.4, CRP 1 - CT A/P on 3/23/21: S/P hysterectomy without apparent complication. Small fat-containing umbilical hernia. No pelvic or inguinal hernia. No free fluid seen within the pelvic cavity.  - Labs 1/4/20: TP 7.9, Alb 4.8, TB 0.4, AP 95, AST 17, ALT 19, Amylase 52, Lipase 22.  - MRI on 8/13/20: Visualized portions of the lower chest normal. Unremarkable liver, GB, biliary tree, pancreas, spleen, adrenal glands, kidneys and ureters. Retroverted uterus, no myometrial mass. Several Nabothian cysts in the cervix.  - HIDA scan in 2019 was normal. EF was 70%.  - E/C March 2019 by Dr. Corbett: small HH, antral edema (no H pylori), normal duodenum (no celiac sprue). Normal colon to the cecum.  - RUQ US: 2mm GB polyp, o/w unremarkable.  - Labs 6/16: Normal CBC, CMP, TSH and HbA1c.

## 2024-07-12 ENCOUNTER — OFFICE VISIT (OUTPATIENT)
Dept: URBAN - METROPOLITAN AREA CLINIC 78 | Facility: CLINIC | Age: 49
End: 2024-07-12
Payer: COMMERCIAL

## 2024-07-12 VITALS
WEIGHT: 148.6 LBS | SYSTOLIC BLOOD PRESSURE: 103 MMHG | HEIGHT: 64 IN | DIASTOLIC BLOOD PRESSURE: 69 MMHG | TEMPERATURE: 98.1 F | HEART RATE: 74 BPM | BODY MASS INDEX: 25.37 KG/M2

## 2024-07-12 DIAGNOSIS — R10.11 RUQ PAIN: ICD-10-CM

## 2024-07-12 DIAGNOSIS — R14.0 ABDOMINAL BLOATING: ICD-10-CM

## 2024-07-12 DIAGNOSIS — R14.3 FLATULENCE SYMPTOM: ICD-10-CM

## 2024-07-12 DIAGNOSIS — R74.01 ELEVATED ALT MEASUREMENT: ICD-10-CM

## 2024-07-12 DIAGNOSIS — K82.4 GALLBLADDER POLYP: ICD-10-CM

## 2024-07-12 PROCEDURE — 99213 OFFICE O/P EST LOW 20 MIN: CPT | Performed by: INTERNAL MEDICINE

## 2024-07-12 RX ORDER — OMEPRAZOLE 40 MG/1
1 CAPSULE 30 MINUTES BEFORE MORNING MEAL AND 30 MINUTES BEFORE DINNER CAPSULE, DELAYED RELEASE ORAL BID
Qty: 60 | Refills: 2 | Status: ACTIVE | COMMUNITY

## 2024-07-12 RX ORDER — FLUTICASONE PROPIONATE 50 UG/1
USE ONE SPRAY IN EACH NOSTRIL ONE TIME DAILY SPRAY, METERED NASAL
Qty: 16 UNSPECIFIED | Refills: 1 | Status: ON HOLD | COMMUNITY

## 2024-07-12 RX ORDER — ONDANSETRON HYDROCHLORIDE 4 MG/1
1 TABLET TABLET, FILM COATED ORAL
Qty: 7 | Refills: 0 | Status: ON HOLD | COMMUNITY
Start: 2022-07-28

## 2024-07-12 RX ORDER — HYOSCYAMINE SULFATE 0.12 MG/1
1 TABLET UNDER THE TONGUE AND ALLOW TO DISSOLVE  AS NEEDED TABLET, ORALLY DISINTEGRATING ORAL
Qty: 40 | Refills: 1 | Status: ON HOLD | COMMUNITY

## 2024-07-12 RX ORDER — CIPROFLOXACIN HYDROCHLORIDE 500 MG/1
TABLET, FILM COATED ORAL
Qty: 14 TABLET | Status: ON HOLD | COMMUNITY

## 2024-07-12 RX ORDER — NAPROXEN 500 MG/1
TABLET ORAL
Qty: 14 TABLET | Status: ON HOLD | COMMUNITY

## 2024-07-12 RX ORDER — PANTOPRAZOLE SODIUM 40 MG/1
TAKE ONE TABLET BY MOUTH EVERY MORNING TABLET, DELAYED RELEASE ORAL
Qty: 30 UNSPECIFIED | Refills: 1 | Status: ACTIVE | COMMUNITY

## 2024-07-12 RX ORDER — CELECOXIB 200 MG/1
TAKE ONE CAPSULE BY MOUTH ONE TIME DAILY CAPSULE ORAL
Qty: 30 UNSPECIFIED | Refills: 0 | Status: ON HOLD | COMMUNITY

## 2024-07-12 NOTE — HPI-OTHER HISTORIES
Patient seen in my office.  Patient is presenting for new symptoms.  Predominantly abdominal bloating and flatulence.  She has the symptoms for the last 3 months.  It affects her quality of life.have simple silentbefore I can tell you yes male with a half  Patient has a history of constipation.  She denies any significant change in bowel habits but now she reports excess flatulence and more frequent BMs       PREVIOUS ENCOUNTER:   Has had extensive workup done summarized below for right upper quadrant pain.  She has been seeing Dr. Ho and undergone extensive workup as enumerated below.  The reason she is here is because of her labs in October revealed an ALT elevation of 31 and repeat labs normalized.  She was alarmed because her MA told her that her liver was damaged overall she does not drink alcohol regularly but has cut back since her recent labs.   Labs 11/14/2023:BUN 12/0.79Sodium 141 with potassium 4.0 with chloride of 105 calcium 9.8 GGT 15Albumin 4.9 GGT 15 AST/ALT 16/13  CMP is normal  Amylas and Lipase is normal   10/24/23  ALT 31  WBC 11.6  CMP is normal otherwise   Summary of prior workup: - CTE abdomen and pelvis showed mild inflammatory changes and bowel wall thickening involving the rectosigmoid. Mild diffuse urinary bladder wall thickening with mild surrounding inflammatory changes suggestive of cystitis. - Labs on 5/4/2022: Celiac panel negative. WBC 6.1, hemoglobin 12.9, MCV 90, platelets 254. Glucose 83, BUN 15, creatinine 0.7, sodium 142, potassium 3.9, calcium 9.2, total protein 7.1, albumin 4.6, total bilirubin 0.7, alkaline phosphatase 72, AST 15, ALT 12. TSH 1.86, free T4 6.4, CRP 1 - CT A/P on 3/23/21: S/P hysterectomy without apparent complication. Small fat-containing umbilical hernia. No pelvic or inguinal hernia. No free fluid seen within the pelvic cavity. - Labs 1/4/20: TP 7.9, Alb 4.8, TB 0.4, AP 95, AST 17, ALT 19, Amylase 52, Lipase 22. - MRI on 8/13/20: Visualized portions of the lower chest normal. Unremarkable liver, GB, biliary tree, pancreas, spleen, adrenal glands, kidneys and ureters. Retroverted uterus, no myometrial mass. Several Nabothian cysts in the cervix. - HIDA scan in 2019 was normal. EF was 70%. - E/C March 2019 by Dr. Corbett: small HH, antral edema (no H pylori), normal duodenum (no celiac sprue). Normal colon to the cecum. - RUQ US: 2mm GB polyp, o/w unremarkable. - Labs 6/16: Normal CBC, CMP, TSH and HbA1c.      PREVIOUS ENCOUNTER:  She had intially seen me for a burning in the RUQ, right below her breast. It is intermittent. She  states that when she gets the pain she is doubled over. She feels that she cannot breath or walk. More concerningly she developed a nodule in that area just a few days ago followed by a localized bruise "which developed out of the blue" once that nodule disappeared. She had never developed a bruise in this area previously. She specifically denies any trauma to the site.  She has been compliant with both the Omeprazole and IB Antonina. She feels that the bloating has improved but not the pain.  She has the pain currently, but it is different from her baseline. She currently descibes it as a dull/burning sensation, constant, 7-8/10 in intensity.  She has her GB in place nad has undergone extensive prior evaluation as detailed below. She has a known small GB polyp.  She has suffered from constipation in the past. She used to be on Linzess. She is now taking prunes which she feels help her tremendously with having a soft, formed BM daily.  She has recently also been using Colace. She has not yet tried using the Levsin.   Patient was seen on 5/26/2022 by Dr. Rowland's group for constipation with occasional diarrhea and abnormal CT suggestive of inflammation of the sigmoid colon. She has not had dysuria per se, but she admits to an itching when she urinates. She has noted a tenderness in the LLQ and pain radiating to her back. She has noted rare instances of mild amount of blood both in the TP and mixed in the stools. She believes it may be due to hemorrhoids.   Her aunt had bile duct cancer.   She had a hysterectomy 4 weeks ago. She took pain meds for about 3-4 days (Oxycodone) ad since has been taking Ibuprofen prn. She was told she may have had some endometriosis.   She got COVID in December.   She was seen previously (Dec 2021) by Dr. Vitale for positive hep C antibody but negative PCR suggestive of either previous infection with clearance or false positive serology.  Summary of prior workup: - CTE abdomen and pelvis showed mild inflammatory changes and bowel wall thickening involving the rectosigmoid.  Mild diffuse urinary bladder wall thickening with mild surrounding inflammatory changes suggestive of cystitis. - Labs on 5/4/2022: Celiac panel negative.  WBC 6.1, hemoglobin 12.9, MCV 90, platelets 254.  Glucose 83, BUN 15, creatinine 0.7, sodium 142, potassium 3.9, calcium 9.2, total protein 7.1, albumin 4.6, total bilirubin 0.7, alkaline phosphatase 72, AST 15, ALT 12.  TSH 1.86, free T4 6.4, CRP 1 - CT A/P on 3/23/21: S/P hysterectomy without apparent complication. Small fat-containing umbilical hernia. No pelvic or inguinal hernia. No free fluid seen within the pelvic cavity.  - Labs 1/4/20: TP 7.9, Alb 4.8, TB 0.4, AP 95, AST 17, ALT 19, Amylase 52, Lipase 22.  - MRI on 8/13/20: Visualized portions of the lower chest normal. Unremarkable liver, GB, biliary tree, pancreas, spleen, adrenal glands, kidneys and ureters. Retroverted uterus, no myometrial mass. Several Nabothian cysts in the cervix.  - HIDA scan in 2019 was normal. EF was 70%.  - E/C March 2019 by Dr. Corbett: small HH, antral edema (no H pylori), normal duodenum (no celiac sprue). Normal colon to the cecum.  - RUQ US: 2mm GB polyp, o/w unremarkable.  - Labs 6/16: Normal CBC, CMP, TSH and HbA1c.

## 2024-09-25 NOTE — PHYSICAL EXAM NECK/THYROID:
Medication:    atorvastatin (Lipitor) 10 MG tablet         Sig: Take 1 tablet by mouth daily.    Disp: 90 tablet    Refills: 0    Start: 9/25/2024    Class: Eprescribe    Non-formulary    Last ordered: 3 months ago (6/26/2024) by Daniel Sewell MD    Hmg CoA Reductase Inhibitors (Statin) Refill Protocol - 12 Month Protocol [id number]/2024 07:38 AM   Protocol Details Lipid Panel or Direct LDL resulted within last 15 months -- IF CRITERIA FAILED REFER TO PROTOCOL DETAILS    Patient is NOT on Gemfibrozil    Seen by prescribing provider or same department within the last 12 months or has a future appt in 3 months - IF FAILED PLEASE LOOK AT CHART REVIEW FOR LAST VISIT AND PROCEED ACCORDINGLY    Request is NOT for Simvastatin 80 mg or Vytorin 10-80 mg    Medication (including dose and sig) on current meds list      To be filled at: [facility]/pharmacy #[id number] - [place], [place] - [address]       passed protocol.   Last office visit date: 4/22/24  Next appointment scheduled?: Yes   Number of refills given: 2   normal appearance , without tenderness upon palpation , no deformities , trachea midline , Thyroid normal size , no thyroid nodules , no masses , no JVD , thyroid nontender